# Patient Record
Sex: FEMALE | URBAN - METROPOLITAN AREA
[De-identification: names, ages, dates, MRNs, and addresses within clinical notes are randomized per-mention and may not be internally consistent; named-entity substitution may affect disease eponyms.]

---

## 2024-01-02 ENCOUNTER — APPOINTMENT (OUTPATIENT)
Dept: URBAN - METROPOLITAN AREA CLINIC 302 | Age: 59
Setting detail: DERMATOLOGY
End: 2024-01-03

## 2024-01-02 DIAGNOSIS — H02.72 MADAROSIS OF EYELID AND PERIOCULAR AREA: ICD-10-CM

## 2024-01-02 DIAGNOSIS — L57.8 OTHER SKIN CHANGES DUE TO CHRONIC EXPOSURE TO NONIONIZING RADIATION: ICD-10-CM

## 2024-01-02 DIAGNOSIS — L81.4 OTHER MELANIN HYPERPIGMENTATION: ICD-10-CM

## 2024-01-02 PROBLEM — H02.724 MADAROSIS OF LEFT UPPER EYELID AND PERIOCULAR AREA: Status: ACTIVE | Noted: 2024-01-02

## 2024-01-02 PROBLEM — H02.722 MADAROSIS OF RIGHT LOWER EYELID AND PERIOCULAR AREA: Status: ACTIVE | Noted: 2024-01-02

## 2024-01-02 PROCEDURE — 99203 OFFICE O/P NEW LOW 30 MIN: CPT

## 2024-01-02 PROCEDURE — OTHER ADDITIONAL NOTES: OTHER

## 2024-01-02 PROCEDURE — OTHER PRESCRIPTION: OTHER

## 2024-01-02 PROCEDURE — OTHER PRESCRIPTION MEDICATION MANAGEMENT: OTHER

## 2024-01-02 PROCEDURE — OTHER COUNSELING: OTHER

## 2024-01-02 PROCEDURE — OTHER MIPS QUALITY: OTHER

## 2024-01-02 RX ORDER — BIMATOPROST 0.3 MG/ML
SOLUTION/ DROPS OPHTHALMIC QHS
Qty: 5 | Refills: 6 | Status: ERX | COMMUNITY
Start: 2024-01-02

## 2024-01-02 RX ORDER — TRETIONIN 0.5 MG/G
CREAM TOPICAL
Qty: 45 | Refills: 6 | Status: ERX | COMMUNITY
Start: 2024-01-02

## 2024-01-02 ASSESSMENT — LOCATION SIMPLE DESCRIPTION DERM
LOCATION SIMPLE: LEFT LATERAL SUPERIOR TARSAL REGION
LOCATION SIMPLE: RIGHT TEMPLE
LOCATION SIMPLE: INFERIOR FOREHEAD
LOCATION SIMPLE: RIGHT LOWER LID TARSAL MARGIN

## 2024-01-02 ASSESSMENT — LOCATION DETAILED DESCRIPTION DERM
LOCATION DETAILED: RIGHT TARSAL MARGIN OF THE INFERIOR EYELID
LOCATION DETAILED: RIGHT CENTRAL TEMPLE
LOCATION DETAILED: LEFT LATERAL SUPERIOR TARSAL REGION
LOCATION DETAILED: INFERIOR MID FOREHEAD

## 2024-01-02 ASSESSMENT — LOCATION ZONE DERM
LOCATION ZONE: EYELID
LOCATION ZONE: FACE

## 2024-01-02 NOTE — HPI: OTHER
Condition:: Different textured skin on the right side of face
Please Describe Your Condition:: Pt states the texture of her skin near her eye feels different compared to the rest of her skin.\\nPt would like to discuss taking care of brown spots on her face as well.

## 2024-01-02 NOTE — PROCEDURE: ADDITIONAL NOTES
Detail Level: Simple
Render Risk Assessment In Note?: no
Additional Notes: Discussed purchasing Obagi eyelash serum if Latisse that was prescribed as requested is not covered or too expensive.
Additional Notes: Discussed different Laser, IPL, and microneedling options with patient that our offices offer and how to consult for these treatments.

## 2024-01-02 NOTE — PROCEDURE: COUNSELING
Detail Level: Zone
Sunscreen Recommendation Label Override: Broad Spectrum Sunscreen SPF 30+
Detail Level: Simple

## 2024-01-02 NOTE — PROCEDURE: PRESCRIPTION MEDICATION MANAGEMENT
Initiate Treatment: tretinoin 0.05 % topical cream \\nQuantity: 45.0 g  Days Supply: 30\\nSig: Apply to face M,W,F at night only after cleansing face.
Render In Strict Bullet Format?: No
Detail Level: Simple
Initiate Treatment: Latisse 0.03 % eyelash drops QHS\\nQuantity: 5.0 ml  Days Supply: 30\\nSig: Apply to base of eyelashes once daily
Initiate Treatment: tretinoin .05% cream QM,W,FHS

## 2025-08-13 ENCOUNTER — APPOINTMENT (OUTPATIENT)
Age: 60
Setting detail: DERMATOLOGY
End: 2025-08-13

## 2025-08-13 VITALS — WEIGHT: 220 LBS | HEIGHT: 66 IN

## 2025-08-13 DIAGNOSIS — L82.1 OTHER SEBORRHEIC KERATOSIS: ICD-10-CM

## 2025-08-13 PROCEDURE — 99212 OFFICE O/P EST SF 10 MIN: CPT

## 2025-08-13 PROCEDURE — OTHER COUNSELING: OTHER

## 2025-08-13 PROCEDURE — OTHER MIPS QUALITY: OTHER

## 2025-08-13 ASSESSMENT — LOCATION ZONE DERM: LOCATION ZONE: NECK

## 2025-08-13 ASSESSMENT — LOCATION DETAILED DESCRIPTION DERM
LOCATION DETAILED: LEFT CLAVICULAR NECK
LOCATION DETAILED: LEFT INFERIOR ANTERIOR NECK
LOCATION DETAILED: RIGHT INFERIOR LATERAL NECK

## 2025-08-13 ASSESSMENT — LOCATION SIMPLE DESCRIPTION DERM
LOCATION SIMPLE: RIGHT ANTERIOR NECK
LOCATION SIMPLE: LEFT ANTERIOR NECK